# Patient Record
Sex: MALE | Race: WHITE | Employment: STUDENT | URBAN - METROPOLITAN AREA
[De-identification: names, ages, dates, MRNs, and addresses within clinical notes are randomized per-mention and may not be internally consistent; named-entity substitution may affect disease eponyms.]

---

## 2020-08-28 ENCOUNTER — HOSPITAL ENCOUNTER (OUTPATIENT)
Dept: GENERAL RADIOLOGY | Facility: HOSPITAL | Age: 20
Discharge: HOME | End: 2020-08-28
Attending: PEDIATRICS
Payer: COMMERCIAL

## 2020-08-28 DIAGNOSIS — R07.9 LEFT-SIDED CHEST PAIN: ICD-10-CM

## 2020-08-28 PROCEDURE — 71046 X-RAY EXAM CHEST 2 VIEWS: CPT | Performed by: PEDIATRICS

## 2021-08-22 ENCOUNTER — HOSPITAL ENCOUNTER (OUTPATIENT)
Age: 21
Discharge: HOME | End: 2021-08-22
Payer: COMMERCIAL

## 2021-08-22 VITALS
SYSTOLIC BLOOD PRESSURE: 103 MMHG | OXYGEN SATURATION: 100 % | TEMPERATURE: 98 F | DIASTOLIC BLOOD PRESSURE: 81 MMHG | WEIGHT: 130 LBS | RESPIRATION RATE: 18 BRPM | HEIGHT: 71 IN | HEART RATE: 90 BPM | BODY MASS INDEX: 18.2 KG/M2

## 2021-08-22 DIAGNOSIS — H10.32 ACUTE BACTERIAL CONJUNCTIVITIS OF LEFT EYE: Primary | ICD-10-CM

## 2021-08-22 DIAGNOSIS — J06.9 UPPER RESPIRATORY TRACT INFECTION, UNSPECIFIED TYPE: ICD-10-CM

## 2021-08-22 PROCEDURE — 99213 OFFICE O/P EST LOW 20 MIN: CPT

## 2021-08-22 PROCEDURE — 99203 OFFICE O/P NEW LOW 30 MIN: CPT

## 2021-08-22 RX ORDER — OFLOXACIN 3 MG/ML
2 SOLUTION/ DROPS OPHTHALMIC 4 TIMES DAILY
Qty: 10 ML | Refills: 0 | Status: SHIPPED | OUTPATIENT
Start: 2021-08-22 | End: 2021-08-29

## 2021-08-22 NOTE — ED PROVIDER NOTES
Patient Seen in: [facility]      History   Patient presents with:   Eye Visual Problem  Cough/URI  Sore Throat    Stated Complaint: Eye Irritation/Dishcarge    HPI/Subjective:   80-year-old male presents to immediate care for left eye redn Pulmonary:      Effort: Pulmonary effort is normal.   Musculoskeletal:         General: Normal range of motion. Skin:     General: Skin is warm and dry. Capillary Refill: Capillary refill takes less than 2 seconds.    Neurological:      General: No

## 2021-08-22 NOTE — ED INITIAL ASSESSMENT (HPI)
L eye swelling/discharge x 2 days; sore throat x 1 week, cough since Thurs; covid neg on Friday    No fever/sob

## 2021-12-21 ENCOUNTER — HOSPITAL ENCOUNTER (OUTPATIENT)
Age: 21
Discharge: HOME | End: 2021-12-21
Payer: COMMERCIAL

## 2021-12-21 VITALS
TEMPERATURE: 99 F | OXYGEN SATURATION: 99 % | WEIGHT: 134 LBS | SYSTOLIC BLOOD PRESSURE: 132 MMHG | RESPIRATION RATE: 16 BRPM | HEART RATE: 100 BPM | HEIGHT: 70 IN | DIASTOLIC BLOOD PRESSURE: 84 MMHG | BODY MASS INDEX: 19.18 KG/M2

## 2021-12-21 DIAGNOSIS — J02.9 THROAT SORENESS: Primary | ICD-10-CM

## 2021-12-21 DIAGNOSIS — U07.1 LAB TEST POSITIVE FOR DETECTION OF COVID-19 VIRUS: ICD-10-CM

## 2021-12-21 PROCEDURE — 99213 OFFICE O/P EST LOW 20 MIN: CPT

## 2021-12-21 PROCEDURE — 99212 OFFICE O/P EST SF 10 MIN: CPT

## 2021-12-21 NOTE — ED PROVIDER NOTES
Patient Seen in: [facility]      History   Patient presents with:  Sore Throat    Stated Complaint: fever,sore throat    Subjective:   HPI    25 yo male here with complaint of fever and sore throat.  Patient has had 2 vaccinations no boost Appearance: He is well-developed. HENT:      Head: Normocephalic. Jaw: There is normal jaw occlusion. Right Ear: Tympanic membrane and external ear normal.      Left Ear: External ear normal.      Nose: Rhinorrhea present. Rhinorrhea is clear. treatment. The patient is encouraged to return if any concerning symptoms arise. Additional verbal discharge instructions are given and discussed. Discharge medications are discussed.  The patient is in good condition throughout the visit today and r

## 2023-03-10 PROBLEM — L70.0 ACNE VULGARIS: Status: ACTIVE | Noted: 2023-03-10

## 2024-05-06 ENCOUNTER — OFFICE VISIT (OUTPATIENT)
Dept: INTERNAL MEDICINE CLINIC | Facility: CLINIC | Age: 24
End: 2024-05-06
Payer: COMMERCIAL

## 2024-05-06 VITALS
BODY MASS INDEX: 19.18 KG/M2 | OXYGEN SATURATION: 97 % | DIASTOLIC BLOOD PRESSURE: 76 MMHG | RESPIRATION RATE: 18 BRPM | HEART RATE: 88 BPM | SYSTOLIC BLOOD PRESSURE: 124 MMHG | WEIGHT: 134 LBS | HEIGHT: 70 IN | TEMPERATURE: 98 F

## 2024-05-06 DIAGNOSIS — H40.003 GLAUCOMA SUSPECT OF BOTH EYES: ICD-10-CM

## 2024-05-06 DIAGNOSIS — Z13.228 SCREENING FOR METABOLIC DISORDER: ICD-10-CM

## 2024-05-06 DIAGNOSIS — Z13.220 SCREENING FOR LIPID DISORDERS: ICD-10-CM

## 2024-05-06 DIAGNOSIS — Z13.0 SCREENING FOR BLOOD DISEASE: ICD-10-CM

## 2024-05-06 DIAGNOSIS — Z13.29 SCREENING FOR THYROID DISORDER: ICD-10-CM

## 2024-05-06 DIAGNOSIS — Z00.00 ROUTINE GENERAL MEDICAL EXAMINATION AT A HEALTH CARE FACILITY: Primary | ICD-10-CM

## 2024-05-06 DIAGNOSIS — L70.0 ACNE VULGARIS: ICD-10-CM

## 2024-05-06 PROCEDURE — 3074F SYST BP LT 130 MM HG: CPT | Performed by: INTERNAL MEDICINE

## 2024-05-06 PROCEDURE — 3078F DIAST BP <80 MM HG: CPT | Performed by: INTERNAL MEDICINE

## 2024-05-06 PROCEDURE — 3008F BODY MASS INDEX DOCD: CPT | Performed by: INTERNAL MEDICINE

## 2024-05-06 PROCEDURE — 99385 PREV VISIT NEW AGE 18-39: CPT | Performed by: INTERNAL MEDICINE

## 2024-05-06 NOTE — PROGRESS NOTES
Ezequiel Sharpe  4/18/2000    Chief Complaint   Patient presents with    [facility]     TA RM6       HPI:   Ezequiel Sharpe is a 24 year old male who presents for an annual physical examination.    The patient has maintained his usual state of health.     No chronic medical conditions.    No regular use of medications.    No acute concerns at this time.    Current Outpatient Medications   Medication Sig Dispense Refill    Tretinoin Microsphere 0.1 % External Gel Apply 2 Pump topically nightly. 50 g 2    clindamycin 1 % External Lotion Apply to the face every morning 1 each 5    Benzoyl Peroxide 5.3 % External Foam Apply to face in the morning twice weekly 1 each 1    ibuprofen (MOTRIN) 200 MG Oral Tab Take 1 tablet (200 mg total) by mouth every 6 (six) hours as needed for Pain. 1730 last dose        No Known Allergies   Past Medical History:    Acne    Seasonal allergies      Patient Active Problem List   Diagnosis    Acne vulgaris    Glaucoma suspect of both eyes      No past surgical history on file.   No family history on file.   Social History     Socioeconomic History    Marital status: Single   Tobacco Use    Smoking status: Never    Smokeless tobacco: Never   Vaping Use    Vaping status: Never Used   Substance and Sexual Activity    Alcohol use: Yes     Comment: occ    Drug use: Never         REVIEW OF SYSTEMS:   GENERAL: feels well otherwise  SKIN: no rashes  EYES:denies blurred vision or double vision  HEENT: not congested  LUNGS: denies shortness of breath with exertion  CARDIOVASCULAR: denies chest pain on exertion  GI: no nausea or abdominal pain  NEURO: denies headaches    EXAM:   /76   Pulse 88   Temp 97.8 °F (36.6 °C)   Resp 18   Ht 5' 10\" (1.778 m)   Wt 134 lb (60.8 kg)   SpO2 97%   BMI 19.23 kg/m²   GENERAL: Well developed, well nourished,in no apparent distress  SKIN: No rashes,no suspicious lesions  EYES: bilateral conjunctiva are clear  HEENT: atraumatic, normocephalic. TM  WNL BL.  NECK: supple,no adenopathy,no bruits  LUNGS: clear to auscultation  CARDIO: RRR without murmur  GI: good BS's,no masses, HSM or tenderness    ASSESSMENT AND PLAN:   Ezequiel Sharpe is a 24 year old male who presents for an annual physical examination.    Outstanding screening and preventive measures:  Tetanus immunization: UTD; records requested  HPV immunization: suspected UTD. Records requested.    Acne:  Stable and controlled with tretinoin therapy per dermatology service    Glaucoma suspect, bilateral:  Stable  Following with ophthalmology service    Routine/screening laboratory evaluation pending collection; recommendations to follow    The patient indicates understanding of these issues and agrees to the plan.  TODAY'S ORDERS     Orders Placed This Encounter   Procedures    CBC With Differential With Platelet    Comp Metabolic Panel    Lipid Panel    TSH W Reflex To Free T4       Meds & Refills:  Requested Prescriptions      No prescriptions requested or ordered in this encounter       Imaging & Consults:  None    No follow-ups on file.  There are no Patient Instructions on file for this visit.    All questions were answered and the patient agrees with the plan.     Thank you,  Fer Rossi MD